# Patient Record
Sex: MALE | Race: WHITE | NOT HISPANIC OR LATINO | URBAN - METROPOLITAN AREA
[De-identification: names, ages, dates, MRNs, and addresses within clinical notes are randomized per-mention and may not be internally consistent; named-entity substitution may affect disease eponyms.]

---

## 2017-09-22 PROBLEM — 398050005 DIVERTICULAR DISEASE OF COLON: Status: ACTIVE | Noted: 2017-09-22

## 2022-05-09 ENCOUNTER — WEB ENCOUNTER (OUTPATIENT)
Dept: URBAN - METROPOLITAN AREA CLINIC 35 | Facility: CLINIC | Age: 57
End: 2022-05-09

## 2022-05-09 ENCOUNTER — OFFICE VISIT (OUTPATIENT)
Dept: URBAN - METROPOLITAN AREA CLINIC 35 | Facility: CLINIC | Age: 57
End: 2022-05-09
Payer: COMMERCIAL

## 2022-05-09 VITALS
HEART RATE: 63 BPM | DIASTOLIC BLOOD PRESSURE: 79 MMHG | WEIGHT: 181.6 LBS | HEIGHT: 70 IN | SYSTOLIC BLOOD PRESSURE: 120 MMHG | BODY MASS INDEX: 26 KG/M2 | OXYGEN SATURATION: 98 %

## 2022-05-09 DIAGNOSIS — Z86.010 PERSONAL HISTORY OF COLONIC POLYPS: ICD-10-CM

## 2022-05-09 PROCEDURE — 99203 OFFICE O/P NEW LOW 30 MIN: CPT | Performed by: PHYSICIAN ASSISTANT

## 2022-05-09 RX ORDER — SODIUM, POTASSIUM,MAG SULFATES 17.5-3.13G
177ML SOLUTION, RECONSTITUTED, ORAL ORAL
Qty: 1 | Refills: 0 | OUTPATIENT
Start: 2022-05-09 | End: 2022-05-11

## 2022-05-09 NOTE — HPI-SURVEILLANCE COLONOSCOPY
57 year old male patient presents today for surveillance colonoscopy consult. Patient states last colonoscopy was 09.08.2017 by Dr. Ortiz, with findings of colon polyps. Patient admits a personal history of colon polyps. Patient denies a family history of colon polyps or colon cancer.  Currently, having 1-2 bowel movements a day. Stools are normal without the presence of blood,  mucus, and melena.

## 2022-05-20 ENCOUNTER — TELEPHONE ENCOUNTER (OUTPATIENT)
Dept: URBAN - METROPOLITAN AREA CLINIC 35 | Facility: CLINIC | Age: 57
End: 2022-05-20

## 2022-05-25 ENCOUNTER — OFFICE VISIT (OUTPATIENT)
Dept: URBAN - METROPOLITAN AREA SURGERY CENTER 8 | Facility: SURGERY CENTER | Age: 57
End: 2022-05-25

## 2022-05-26 PROBLEM — 428283002: Status: ACTIVE | Noted: 2022-05-09

## 2022-06-08 ENCOUNTER — OFFICE VISIT (OUTPATIENT)
Dept: URBAN - METROPOLITAN AREA CLINIC 35 | Facility: CLINIC | Age: 57
End: 2022-06-08

## 2022-06-10 ENCOUNTER — CLAIMS CREATED FROM THE CLAIM WINDOW (OUTPATIENT)
Dept: URBAN - METROPOLITAN AREA CLINIC 4 | Facility: CLINIC | Age: 57
End: 2022-06-10
Payer: COMMERCIAL

## 2022-06-10 ENCOUNTER — OFFICE VISIT (OUTPATIENT)
Dept: URBAN - METROPOLITAN AREA SURGERY CENTER 8 | Facility: SURGERY CENTER | Age: 57
End: 2022-06-10
Payer: COMMERCIAL

## 2022-06-10 DIAGNOSIS — K63.89 BACTERIAL OVERGROWTH SYNDROME: ICD-10-CM

## 2022-06-10 DIAGNOSIS — D12.4 ADENOMA OF DESCENDING COLON: ICD-10-CM

## 2022-06-10 DIAGNOSIS — D12.4 BENIGN NEOPLASM OF DESCENDING COLON: ICD-10-CM

## 2022-06-10 DIAGNOSIS — Z86.010 ADENOMAS PERSONAL HISTORY OF COLONIC POLYPS: ICD-10-CM

## 2022-06-10 DIAGNOSIS — Z86.010 PERSONAL HISTORY OF COLONIC POLYPS: ICD-10-CM

## 2022-06-10 DIAGNOSIS — K63.89 OTHER SPECIFIED DISEASES OF INTESTINE: ICD-10-CM

## 2022-06-10 DIAGNOSIS — Z12.11 COLON CANCER SCREENING: ICD-10-CM

## 2022-06-10 DIAGNOSIS — Z12.11 ENCOUNTER FOR SCREENING FOR MALIGNANT NEOPLASM OF COLON: ICD-10-CM

## 2022-06-10 PROCEDURE — 88300 SURGICAL PATH GROSS: CPT | Performed by: PATHOLOGY

## 2022-06-10 PROCEDURE — G8907 PT DOC NO EVENTS ON DISCHARG: HCPCS | Performed by: INTERNAL MEDICINE

## 2022-06-10 PROCEDURE — 45385 COLONOSCOPY W/LESION REMOVAL: CPT | Performed by: INTERNAL MEDICINE

## 2022-06-10 PROCEDURE — 45380 COLONOSCOPY AND BIOPSY: CPT | Performed by: INTERNAL MEDICINE

## 2022-06-10 PROCEDURE — 88305 TISSUE EXAM BY PATHOLOGIST: CPT | Performed by: PATHOLOGY

## 2022-07-08 ENCOUNTER — DASHBOARD ENCOUNTERS (OUTPATIENT)
Age: 57
End: 2022-07-08

## 2022-07-08 ENCOUNTER — OFFICE VISIT (OUTPATIENT)
Dept: URBAN - METROPOLITAN AREA CLINIC 35 | Facility: CLINIC | Age: 57
End: 2022-07-08
Payer: COMMERCIAL

## 2022-07-08 VITALS
BODY MASS INDEX: 25.48 KG/M2 | SYSTOLIC BLOOD PRESSURE: 110 MMHG | HEART RATE: 77 BPM | WEIGHT: 178 LBS | DIASTOLIC BLOOD PRESSURE: 72 MMHG | OXYGEN SATURATION: 97 % | HEIGHT: 70 IN

## 2022-07-08 DIAGNOSIS — D12.3 BENIGN NEOPLASM OF TRANSVERSE COLON: ICD-10-CM

## 2022-07-08 DIAGNOSIS — K64.0 FIRST DEGREE HEMORRHOIDS: ICD-10-CM

## 2022-07-08 DIAGNOSIS — D12.4 BENIGN NEOPLASM OF DESCENDING COLON: ICD-10-CM

## 2022-07-08 PROCEDURE — 99213 OFFICE O/P EST LOW 20 MIN: CPT | Performed by: PHYSICIAN ASSISTANT

## 2022-07-08 RX ORDER — SILDENAFIL 50 MG/1
1 TABLET TABLET, FILM COATED ORAL ONCE A DAY
Status: ACTIVE | COMMUNITY

## 2022-07-08 NOTE — HPI-COLONOSCOPY FOLLOWUP
57 Year old male patient presents today for a follow up from his colonoscopy on 06/10/2022 with Dr. Ortiz. He denies any complications after his procedure. He currently reports 1 bowel movement per day, without strain. His stools are formed and normal. He denies any mucus, melena or blood in stools. Denies any pruritus ani or rectal pain.  Colonoscopy Findings 06/10/2022: - The perianal and digital rectal examinations were normal.  Pertinent negatives include normal sphincter tone. - Internal hemorrhoids were found during retroflexion.  The hemorrhoids were Grade I (internal hemorrhoids that do not prolapse). - A 5 mm polyp was found in the proximal transverse colon.  The polyp was Suzette classification IIa (superficial, elevated).  The polyp was removed with a cold snare.  Resection and retrieval were complete. - A 4 mm polyp was found in the proximal transverse colon.  The polyp was Suzette classification IIb (flat).  The polyp was removed with a cold biopsy forceps.  Resection and retrieval were complete. - A 4 mm polyp was found in the distal transverse colon.  The polyp was Suzette classification IIa (superficial, elevated).  The polyp was removed with a cold snare.  Resection and retrieval were complete. - A 6 mm polyp was found in the proximal descending colon.  The polyp was Suzette classification Is (protruding, sessile).  The polyp was removed with a cold snare.  Resection and retrieval were complete.  Pathology:  (A) Colon, Transverse, Proximal, Polypectomy: BENIGN MUCOSAL POLYP(S).  See Comment.  Negative for Dysplasia or Malignancy.  COMMENT: We use "benign mucosal polyp" to refer to an endoscopically polypoid lesion that shows no dysplastic or hyperplastic epithelium or classic features of hamartomatous polyp. (B) Colon, Transverse, Distal, Polypectomy: NO TISSUE IS RECOVERED AFTER PROCESSING. PLEASE SEE GROSS AND MICROSCOPIC              DESCRIPTIONS. (C) Colon, Descending, Proximal, Polypectomy TUBULAR ADENOMA(S